# Patient Record
Sex: MALE | Race: WHITE | HISPANIC OR LATINO
[De-identification: names, ages, dates, MRNs, and addresses within clinical notes are randomized per-mention and may not be internally consistent; named-entity substitution may affect disease eponyms.]

---

## 2022-02-11 ENCOUNTER — TRANSCRIPTION ENCOUNTER (OUTPATIENT)
Age: 52
End: 2022-02-11

## 2022-02-25 ENCOUNTER — APPOINTMENT (OUTPATIENT)
Dept: ULTRASOUND IMAGING | Facility: CLINIC | Age: 52
End: 2022-02-25
Payer: MEDICAID

## 2022-02-25 PROCEDURE — 76770 US EXAM ABDO BACK WALL COMP: CPT

## 2022-03-03 ENCOUNTER — APPOINTMENT (OUTPATIENT)
Dept: CT IMAGING | Facility: CLINIC | Age: 52
End: 2022-03-03

## 2022-03-28 ENCOUNTER — APPOINTMENT (OUTPATIENT)
Dept: CT IMAGING | Facility: CLINIC | Age: 52
End: 2022-03-28
Payer: MEDICAID

## 2022-03-28 PROCEDURE — 74177 CT ABD & PELVIS W/CONTRAST: CPT

## 2022-04-23 PROBLEM — Z00.00 ENCOUNTER FOR PREVENTIVE HEALTH EXAMINATION: Status: ACTIVE | Noted: 2022-04-23

## 2024-02-02 ENCOUNTER — APPOINTMENT (OUTPATIENT)
Dept: CARDIOLOGY | Facility: CLINIC | Age: 54
End: 2024-02-02
Payer: MEDICAID

## 2024-02-02 VITALS — DIASTOLIC BLOOD PRESSURE: 62 MMHG | SYSTOLIC BLOOD PRESSURE: 98 MMHG

## 2024-02-02 VITALS
DIASTOLIC BLOOD PRESSURE: 62 MMHG | OXYGEN SATURATION: 98 % | HEART RATE: 77 BPM | BODY MASS INDEX: 29.82 KG/M2 | WEIGHT: 190 LBS | HEIGHT: 67 IN | SYSTOLIC BLOOD PRESSURE: 106 MMHG

## 2024-02-02 DIAGNOSIS — Z78.9 OTHER SPECIFIED HEALTH STATUS: ICD-10-CM

## 2024-02-02 DIAGNOSIS — Z82.49 FAMILY HISTORY OF ISCHEMIC HEART DISEASE AND OTHER DISEASES OF THE CIRCULATORY SYSTEM: ICD-10-CM

## 2024-02-02 PROCEDURE — 99204 OFFICE O/P NEW MOD 45 MIN: CPT

## 2024-02-02 PROCEDURE — 99214 OFFICE O/P EST MOD 30 MIN: CPT

## 2024-02-02 RX ORDER — EMPAGLIFLOZIN 25 MG/1
25 TABLET, FILM COATED ORAL DAILY
Refills: 0 | Status: DISCONTINUED | COMMUNITY
End: 2024-02-02

## 2024-02-02 RX ORDER — METOPROLOL TARTRATE 25 MG/1
25 TABLET, FILM COATED ORAL
Qty: 90 | Refills: 3 | Status: COMPLETED | COMMUNITY
End: 2024-02-02

## 2024-02-02 NOTE — DISCUSSION/SUMMARY
[FreeTextEntry1] :  53 year old predominantly Amharic-speaking gentleman joined by his daughter who is doing very well from technical NSTE-ACS, clinically more consistent with inferior STEMI s/p PCI with thrombectomy and ANABEL x 1 to distal RCA (3.0 x 30 mm post-dil 3.5 mm). Access site looks spectacular. SGLT-2 is not an appropriate first line for DM II A1c 7.7 -- discontinued and started metformin 500 mg BID. Changed twice-daily lopressor to once-daily toprol. Advised patient there should be no interruption to his DAPT for one year unless discussed with me first and a plan is established -- he understands.   Plan:   - Obtain Lipid panel, A1c in 3 months  - Continue ASA 81 mg daily  - Continue ticagrelor 90 mg BID   - Continue atorvastatin 40 mg x 2 tabs nightly   - Switch lopressor to Toprol XL 50 mg daily   - Stop Jardiance   - Start metformin 500 mg BID with food  - Cardiac rehabilitation referral placed   RTC: 1 month  Appreciate the opportunity to participate in the care of Mr. DIAZ. Strict ER precautions were provided to patient should symptoms worsen, or new ones present. Please do not hesitate to reach out with any questions, concerns, or changes in clinical status.   Nicko Sun MD Interventional Cardiology

## 2024-02-02 NOTE — CARDIOLOGY SUMMARY
[de-identified] : TTE 01/27/24 LVEF 60%; inferior wall motion abnormality previously seen has now normalized. No significant valvular issues.

## 2024-02-02 NOTE — HISTORY OF PRESENT ILLNESS
[FreeTextEntry1] : 53 year old gentleman with no prior significant medical history admitted for Type I inferior NSTEMI due to thrombotic occlusion of the distal RCA s/p mechanical aspiration thrombectomy and 3.0 x 30 mm [place] ANABEL post-dilated proximally to 3.5 mm with excellent angiographic effect.   He is doing well today without chest pain, palptiations, shortness of breath. RRA site shows zero hematoma, ecchymosis, tenderness, swelling, or neurovascular dysfunction. He is very happy with the wake up call and making dietary / exercise changes aggressively with the help of his daughter. He has been adherent with all meds, which we went over, but is discouraged by the cost of SGLT  - 2 inhibitor, prescribed at hospital for A1c of 7.7 as monotherapy.

## 2024-02-05 ENCOUNTER — NON-APPOINTMENT (OUTPATIENT)
Age: 54
End: 2024-02-05

## 2024-02-21 LAB — HBA1C MFR BLD HPLC: 6.3

## 2024-03-15 ENCOUNTER — NON-APPOINTMENT (OUTPATIENT)
Age: 54
End: 2024-03-15

## 2024-03-15 ENCOUNTER — APPOINTMENT (OUTPATIENT)
Dept: CARDIOLOGY | Facility: CLINIC | Age: 54
End: 2024-03-15
Payer: MEDICAID

## 2024-03-15 VITALS
BODY MASS INDEX: 30.07 KG/M2 | DIASTOLIC BLOOD PRESSURE: 72 MMHG | WEIGHT: 192 LBS | OXYGEN SATURATION: 99 % | SYSTOLIC BLOOD PRESSURE: 120 MMHG | HEART RATE: 66 BPM

## 2024-03-15 PROCEDURE — 99215 OFFICE O/P EST HI 40 MIN: CPT

## 2024-03-15 PROCEDURE — 93000 ELECTROCARDIOGRAM COMPLETE: CPT

## 2024-03-15 PROCEDURE — G2211 COMPLEX E/M VISIT ADD ON: CPT | Mod: NC,1L

## 2024-03-15 NOTE — CARDIOLOGY SUMMARY
[de-identified] : TTE 01/27/24 LVEF 60%; inferior wall motion abnormality previously seen has now normalized. No significant valvular issues.

## 2024-03-15 NOTE — DISCUSSION/SUMMARY
[FreeTextEntry1] : 53 year old predominantly Wolof-speaking gentleman joined by his daughter who is doing very well from technical NSTE-ACS, clinically more consistent with inferior STEMI s/p PCI with thrombectomy and ANABEL x 1 to distal RCA (3.0 x 30 mm post-dil 3.5 mm). Access site looks spectacular. SGLT-2 is not an appropriate first line for DM II A1c 7.7 -- discontinued and started metformin 500 mg BID. Changed twice-daily lopressor to once-daily toprol. Advised patient there should be no interruption to his DAPT for one year unless discussed with me first and a plan is established -- he understands.   Plan:   - Obtain Lipid panel, A1c in 3 months  - Continue ASA 81 mg daily  - Continue ticagrelor 90 mg BID   - Continue atorvastatin 40 mg x 2 tabs nightly   - Continue Toprol XL 50 mg daily   - Continue metformin 500 mg BID with food  - Cardiac rehabilitation referral placed (pending)   RTC: 3 month  Appreciate the opportunity to participate in the care of Mr. DIAZ. Strict ER precautions were provided to patient should symptoms worsen, or new ones present. Please do not hesitate to reach out with any questions, concerns, or changes in clinical status.   Nicko Sun MD, [facility] Interventional Cardiology   Altogether, I had the privilege of spending 40 minutes on this patient's care, more than 50% of which was during a face-to-face encounter. This does not include time required to obtain/interpret an ECG or time invested in the education of medical trainees who may have participated in the patient's care. [EKG obtained to assist in diagnosis and management of assessed problem(s)] : EKG obtained to assist in diagnosis and management of assessed problem(s)

## 2024-06-14 ENCOUNTER — APPOINTMENT (OUTPATIENT)
Dept: CARDIOLOGY | Facility: CLINIC | Age: 54
End: 2024-06-14
Payer: MEDICAID

## 2024-06-14 DIAGNOSIS — Z13.6 ENCOUNTER FOR SCREENING FOR CARDIOVASCULAR DISORDERS: ICD-10-CM

## 2024-06-14 DIAGNOSIS — J30.2 OTHER SEASONAL ALLERGIC RHINITIS: ICD-10-CM

## 2024-06-14 DIAGNOSIS — Z09 ENCOUNTER FOR FOLLOW-UP EXAMINATION AFTER COMPLETED TREATMENT FOR CONDITIONS OTHER THAN MALIGNANT NEOPLASM: ICD-10-CM

## 2024-06-14 DIAGNOSIS — E11.9 TYPE 2 DIABETES MELLITUS W/OUT COMPLICATIONS: ICD-10-CM

## 2024-06-14 DIAGNOSIS — I25.10 ATHEROSCLEROTIC HEART DISEASE OF NATIVE CORONARY ARTERY W/OUT ANGINA PECTORIS: ICD-10-CM

## 2024-06-14 DIAGNOSIS — I10 ESSENTIAL (PRIMARY) HYPERTENSION: ICD-10-CM

## 2024-06-14 DIAGNOSIS — E11.40 TYPE 2 DIABETES MELLITUS WITH DIABETIC NEUROPATHY, UNSPECIFIED: ICD-10-CM

## 2024-06-14 DIAGNOSIS — Z98.890 OTHER SPECIFIED POSTPROCEDURAL STATES: ICD-10-CM

## 2024-06-14 DIAGNOSIS — N52.9 MALE ERECTILE DYSFUNCTION, UNSPECIFIED: ICD-10-CM

## 2024-06-14 DIAGNOSIS — Z86.39 PERSONAL HISTORY OF OTHER ENDOCRINE, NUTRITIONAL AND METABOLIC DISEASE: ICD-10-CM

## 2024-06-14 DIAGNOSIS — Z95.5 PRESENCE OF CORONARY ANGIOPLASTY IMPLANT AND GRAFT: ICD-10-CM

## 2024-06-14 PROCEDURE — G2211 COMPLEX E/M VISIT ADD ON: CPT | Mod: NC

## 2024-06-14 PROCEDURE — 99214 OFFICE O/P EST MOD 30 MIN: CPT

## 2024-06-14 RX ORDER — SILDENAFIL 50 MG/1
50 TABLET ORAL
Qty: 30 | Refills: 3 | Status: ACTIVE | COMMUNITY
Start: 2024-03-15 | End: 1900-01-01

## 2024-06-14 RX ORDER — METOPROLOL SUCCINATE 50 MG/1
50 TABLET, EXTENDED RELEASE ORAL
Qty: 90 | Refills: 0 | Status: ACTIVE | COMMUNITY
Start: 2024-02-02

## 2024-06-14 RX ORDER — TICAGRELOR 90 MG/1
90 TABLET ORAL TWICE DAILY
Qty: 180 | Refills: 2 | Status: ACTIVE | COMMUNITY

## 2024-06-14 RX ORDER — KRILL/OM-3/DHA/EPA/PHOSPHO/AST 1000-230MG
81 CAPSULE ORAL DAILY
Qty: 90 | Refills: 3 | Status: ACTIVE | COMMUNITY
Start: 2024-02-02

## 2024-06-14 RX ORDER — PANTOPRAZOLE 40 MG/1
40 TABLET, DELAYED RELEASE ORAL EVERY OTHER DAY
Qty: 45 | Refills: 2 | Status: ACTIVE | COMMUNITY

## 2024-06-14 RX ORDER — LORATADINE 10 MG
5-120 TABLET ORAL
Qty: 60 | Refills: 0 | Status: ACTIVE | COMMUNITY
Start: 2024-06-14 | End: 1900-01-01

## 2024-06-14 RX ORDER — METFORMIN HYDROCHLORIDE 1000 MG/1
1000 TABLET, EXTENDED RELEASE ORAL DAILY
Qty: 90 | Refills: 2 | Status: ACTIVE | COMMUNITY
Start: 2024-06-14 | End: 1900-01-01

## 2024-06-14 RX ORDER — METFORMIN HYDROCHLORIDE 500 MG/1
500 TABLET, COATED ORAL
Qty: 180 | Refills: 2 | Status: DISCONTINUED | COMMUNITY
Start: 2024-02-02 | End: 2024-06-14

## 2024-06-14 RX ORDER — EZETIMIBE 10 MG/1
10 TABLET ORAL DAILY
Qty: 30 | Refills: 0 | Status: DISCONTINUED | COMMUNITY
Start: 2024-06-14 | End: 2024-06-14

## 2024-06-14 RX ORDER — ATORVASTATIN CALCIUM 40 MG/1
40 TABLET, FILM COATED ORAL
Qty: 180 | Refills: 2 | Status: ACTIVE | COMMUNITY

## 2024-06-14 NOTE — CARDIOLOGY SUMMARY
[de-identified] : 03/15/24: SR with PACs, old inferior infarct pattern.  [de-identified] : TTE 01/27/24 LVEF 60%; inferior wall motion abnormality previously seen has now normalized. No significant valvular issues.

## 2024-06-14 NOTE — DISCUSSION/SUMMARY
[FreeTextEntry1] : 53 year old predominantly Romansh-speaking gentleman joined by his daughter who is doing very well from technical NSTE-ACS, clinically more consistent with inferior STEMI s/p PCI with thrombectomy and ANABEL x 1 to distal RCA (3.0 x 30 mm post-dil 3.5 mm).  Today - we changed his metformin to ER version -- 1000 mg qPM with dinner.  Advised him he can take half of his metoprolol and not refill his zetia until we review his repeat labs. Advised patient there should be no interruption to his DAPT for one year unless discussed with me first and a plan is established -- he understands.   Plan:   - Obtain Lipid panel, A1c, CMP prior to next visit.   - Continue ASA 81 mg daily  - Continue ticagrelor 90 mg BID   - Continue atorvastatin 80 mg qPM (2 x 40 mg tabs)   - Reduce Toprol XL to 25 mg daily (1/2 tablet of 50 mg tab)   - Change metformin to Metformin ER 1000 mg q PM w/ dinner   - Cardiac rehabilitation referral placed (pending)   RTC: 2-3 month  Appreciate the opportunity to participate in the care of Mr. DIAZ. Strict ER precautions were provided to patient should symptoms worsen, or new ones present. Please do not hesitate to reach out with any questions, concerns, or changes in clinical status.   Nicko Sun MD, [facility] Interventional Cardiology   Altogether, I had the privilege of spending 40 minutes on this patient's care, more than 50% of which was during a face-to-face encounter. This does not include time required to obtain/interpret an ECG or time invested in the education of medical trainees who may have participated in the patient's care. Yes

## 2024-06-14 NOTE — HISTORY OF PRESENT ILLNESS
[FreeTextEntry1] : 53 year old gentleman with no prior significant medical history admitted for Type I inferior NSTEMI due to thrombotic occlusion of the distal RCA s/p mechanical aspiration thrombectomy and 3.0 x 30 mm [place] ANABEL post-dilated proximally to 3.5 mm with excellent angiographic effect.   He is doing well today without chest pain, palpitations, shortness of breath. He has lost approx 15 lbs and looks healthy. No complaints, needs refills. Advised him that his lipid panel was near optimal and that ezetimibe may not be required long term -- will recheck lipid panel along with other labs in 2-3 months. He is very happy with the wake-up call and making dietary / exercise changes aggressively with the help of his daughter. I asked him to obtain blood work prior to his next visit.

## 2024-06-14 NOTE — REASON FOR VISIT
[FreeTextEntry3] : Pillo San MD (PCP) [FreeTextEntry1] :   [facility] Cardiology ([place])  Return Visit Note

## 2024-09-09 DIAGNOSIS — J30.2 OTHER SEASONAL ALLERGIC RHINITIS: ICD-10-CM

## 2024-09-11 ENCOUNTER — APPOINTMENT (OUTPATIENT)
Dept: CARDIOLOGY | Facility: CLINIC | Age: 54
End: 2024-09-11
Payer: MEDICAID

## 2024-09-11 VITALS
WEIGHT: 182 LBS | SYSTOLIC BLOOD PRESSURE: 122 MMHG | OXYGEN SATURATION: 99 % | HEART RATE: 63 BPM | HEIGHT: 67 IN | BODY MASS INDEX: 28.56 KG/M2 | DIASTOLIC BLOOD PRESSURE: 78 MMHG

## 2024-09-11 DIAGNOSIS — E11.9 TYPE 2 DIABETES MELLITUS W/OUT COMPLICATIONS: ICD-10-CM

## 2024-09-11 DIAGNOSIS — Z95.5 PRESENCE OF CORONARY ANGIOPLASTY IMPLANT AND GRAFT: ICD-10-CM

## 2024-09-11 DIAGNOSIS — E78.2 MIXED HYPERLIPIDEMIA: ICD-10-CM

## 2024-09-11 DIAGNOSIS — I25.10 ATHEROSCLEROTIC HEART DISEASE OF NATIVE CORONARY ARTERY W/OUT ANGINA PECTORIS: ICD-10-CM

## 2024-09-11 PROCEDURE — 99214 OFFICE O/P EST MOD 30 MIN: CPT

## 2024-09-11 PROCEDURE — G2211 COMPLEX E/M VISIT ADD ON: CPT | Mod: NC

## 2024-09-11 RX ORDER — CLOPIDOGREL BISULFATE 75 MG/1
75 TABLET, FILM COATED ORAL
Qty: 98 | Refills: 1 | Status: ACTIVE | COMMUNITY
Start: 2024-09-11 | End: 1900-01-01

## 2024-09-11 RX ORDER — ATORVASTATIN CALCIUM 80 MG/1
80 TABLET, FILM COATED ORAL
Qty: 90 | Refills: 2 | Status: ACTIVE | COMMUNITY
Start: 2024-09-11 | End: 1900-01-01

## 2024-09-11 RX ORDER — EMPAGLIFLOZIN 25 MG/1
25 TABLET, FILM COATED ORAL DAILY
Refills: 0 | Status: ACTIVE | COMMUNITY

## 2024-09-11 NOTE — DISCUSSION/SUMMARY
[FreeTextEntry1] : 53 year old predominantly Kyrgyz-speaking gentleman joined by his daughter who is doing very well from technical NSTE-ACS, clinically more consistent with inferior STEMI s/p PCI with thrombectomy and ANABEL x 1 to distal RCA (3.0 x 30 mm post-dil 3.5 mm). Vital signs and physical exam are optimal. Expect lipid profile to improve with increased adherence to statin -- will switch to atorva 80 mg tab to reduce pill burden.  Metoprolol succinate no longer indicated; will discontinue to avoid derangements in lipid profile and A1c.  Advised patient there should be no interruption to his DAPT for one year unless discussed with me first and a plan is established -- he understands.  Plan:   - Continue ASA 81 mg daily until February 1st, 2024  - 24 hours after this morning's ticagrelor dose, will take 8 tablets of clopidogrel 75 mg (total 600 mg) on Thursday 09/12/24. The following day on Friday 09/13/24, will start clopidogrel 75 mg daily (1 tablet daily).   - Will defer metformin to Dr. San and/or patient's end  - Atorvastatin 80 mg daily   - Zetia 10 mg daily   - Discontinued Toprol XL 25 mg daily   - Repeat labs in 3 months (Dec 2024)   RTC: 3 months   Appreciate the opportunity to participate in the care of Mr. DIAZ. Strict ER precautions were provided to patient for symptoms that arise or worsen. Please do not hesitate to reach out with any questions, concerns, or changes in clinical status.   Nicko Sun MD, [facility]  Interventional & Clinical Cardiology  Altogether, I had the privilege of spending 41  minutes on this patient's care, more than 50% of which was during a face-to-face encounter. This does not include time required to obtain/interpret an ECG or time invested in the education of medical trainees who may have participated in the patient's care.

## 2024-09-11 NOTE — HISTORY OF PRESENT ILLNESS
[FreeTextEntry1] : 53 year old gentleman with no prior significant medical history admitted for Type I inferior NSTEMI due to thrombotic occlusion of the distal RCA s/p mechanical aspiration thrombectomy and 3.0 x 30 mm [place] ANABEL post-dilated proximally to 3.5 mm with excellent angiographic effect. Patient presents for lab review and medication refill.   He is doing well today without chest pain, palpitations, shortness of breath. Confides that he has had poor adherence with the atorvastatin, but is taking it again since his cholesterol went up.   CARDIOVASCULAR STUDIES:   03/15/24 ECG: SR w/ PACs, Old inferior infarct pattern 01/27/24 TTE: LVEF 60%. Previous inferior wall motion abnormality has now normalized. Normal valvular structure and function.  01/27/24 LHC: 99% thrombotic occlusion of distal RCA resulting in OLLIE I flow and distally occluded RPDA s/p aspiration thrombectomy + IVUS-guided ANABEL x 1 (3.0 x 30 mm [place] post dil 3.5 mm) 08/09/24 LAB: A1c 6.3 (previously 6.5).  Creatinine 1.05 potassium 4.2 LFTs within normal limits.  Total cholesterol 159 which is slightly increased LDL 86 which is also slightly increased HDL 51.  Triglyceride 179.  CARDIOVASCULAR EXAM:  Vital signs reviewed.   Gen: NAD  Cardiac: RRR, nl s1, s2. Normal PMI. No murmurs, rubs, or gallops. Lungs:  Good air flow. No wheezing or stridor. Ext: Warm. 2+ radial pulses bilaterally. No lower extremity edema.

## 2024-11-18 LAB — HBA1C MFR BLD HPLC: 6.6

## 2024-12-13 ENCOUNTER — APPOINTMENT (OUTPATIENT)
Dept: CARDIOLOGY | Facility: CLINIC | Age: 54
End: 2024-12-13
Payer: MEDICAID

## 2024-12-13 ENCOUNTER — NON-APPOINTMENT (OUTPATIENT)
Age: 54
End: 2024-12-13

## 2024-12-13 VITALS
HEART RATE: 60 BPM | WEIGHT: 185 LBS | SYSTOLIC BLOOD PRESSURE: 118 MMHG | DIASTOLIC BLOOD PRESSURE: 70 MMHG | BODY MASS INDEX: 29.03 KG/M2 | OXYGEN SATURATION: 98 % | HEIGHT: 67 IN

## 2024-12-13 PROCEDURE — 99214 OFFICE O/P EST MOD 30 MIN: CPT

## 2024-12-13 PROCEDURE — G2211 COMPLEX E/M VISIT ADD ON: CPT | Mod: NC

## 2025-03-06 LAB — HBA1C MFR BLD HPLC: 6.3

## 2025-03-07 ENCOUNTER — NON-APPOINTMENT (OUTPATIENT)
Age: 55
End: 2025-03-07

## 2025-03-07 ENCOUNTER — APPOINTMENT (OUTPATIENT)
Dept: CARDIOLOGY | Facility: CLINIC | Age: 55
End: 2025-03-07
Payer: COMMERCIAL

## 2025-03-07 VITALS
BODY MASS INDEX: 29.51 KG/M2 | HEART RATE: 60 BPM | SYSTOLIC BLOOD PRESSURE: 110 MMHG | WEIGHT: 188 LBS | DIASTOLIC BLOOD PRESSURE: 70 MMHG | OXYGEN SATURATION: 97 % | HEIGHT: 67 IN

## 2025-03-07 DIAGNOSIS — E78.2 MIXED HYPERLIPIDEMIA: ICD-10-CM

## 2025-03-07 DIAGNOSIS — E11.9 TYPE 2 DIABETES MELLITUS W/OUT COMPLICATIONS: ICD-10-CM

## 2025-03-07 DIAGNOSIS — Z13.6 ENCOUNTER FOR SCREENING FOR CARDIOVASCULAR DISORDERS: ICD-10-CM

## 2025-03-07 DIAGNOSIS — G47.33 OBSTRUCTIVE SLEEP APNEA (ADULT) (PEDIATRIC): ICD-10-CM

## 2025-03-07 PROCEDURE — 99215 OFFICE O/P EST HI 40 MIN: CPT

## 2025-03-07 PROCEDURE — G2211 COMPLEX E/M VISIT ADD ON: CPT | Mod: NC

## 2025-03-07 PROCEDURE — 93000 ELECTROCARDIOGRAM COMPLETE: CPT

## 2025-03-07 RX ORDER — METFORMIN ER 500 MG 500 MG/1
500 TABLET ORAL
Qty: 90 | Refills: 3 | Status: ACTIVE | COMMUNITY
Start: 2025-03-07 | End: 1900-01-01

## 2025-03-18 ENCOUNTER — OUTPATIENT (OUTPATIENT)
Dept: OUTPATIENT SERVICES | Facility: HOSPITAL | Age: 55
LOS: 1 days | End: 2025-03-18

## 2025-03-18 DIAGNOSIS — G47.33 OBSTRUCTIVE SLEEP APNEA (ADULT) (PEDIATRIC): ICD-10-CM

## 2025-03-18 PROCEDURE — 95800 SLP STDY UNATTENDED: CPT | Mod: 26

## 2025-03-18 PROCEDURE — 95800 SLP STDY UNATTENDED: CPT

## 2025-09-12 ENCOUNTER — APPOINTMENT (OUTPATIENT)
Dept: CARDIOLOGY | Facility: CLINIC | Age: 55
End: 2025-09-12

## 2025-09-12 DIAGNOSIS — Z95.5 PRESENCE OF CORONARY ANGIOPLASTY IMPLANT AND GRAFT: ICD-10-CM

## 2025-09-12 DIAGNOSIS — I25.10 ATHEROSCLEROTIC HEART DISEASE OF NATIVE CORONARY ARTERY W/OUT ANGINA PECTORIS: ICD-10-CM

## 2025-09-12 DIAGNOSIS — G47.33 OBSTRUCTIVE SLEEP APNEA (ADULT) (PEDIATRIC): ICD-10-CM

## 2025-09-12 DIAGNOSIS — E11.9 TYPE 2 DIABETES MELLITUS W/OUT COMPLICATIONS: ICD-10-CM

## 2025-09-12 DIAGNOSIS — E78.2 MIXED HYPERLIPIDEMIA: ICD-10-CM

## 2025-09-12 DIAGNOSIS — Z13.6 ENCOUNTER FOR SCREENING FOR CARDIOVASCULAR DISORDERS: ICD-10-CM
